# Patient Record
Sex: FEMALE
[De-identification: names, ages, dates, MRNs, and addresses within clinical notes are randomized per-mention and may not be internally consistent; named-entity substitution may affect disease eponyms.]

---

## 2023-06-22 ENCOUNTER — NURSE TRIAGE (OUTPATIENT)
Dept: OTHER | Facility: CLINIC | Age: 23
End: 2023-06-22

## 2023-06-22 NOTE — TELEPHONE ENCOUNTER
Location of patient: [place]    Received call from [address] [facility] at [facility] with Red Flag Complaint. [facility] MRN: [id number]    Provider: Enma Marquez    Subjective: Caller states \"headache, htn\"     Current Symptoms:   Headache  HTN - last OV     Pain Severity:   Headache 9/10    Temperature:    None     What has been tried:   Excedrin     Recommended disposition:   Go to ED now     Care advice provided, patient verbalizes understanding; denies any other questions or concerns.     Outcome: ED    This triage is a result of a call to the [facility] [address]      Reason for Disposition   Patient sounds very sick or weak to the triager    Protocols used: Headache-ADULT-[place]